# Patient Record
Sex: FEMALE | Race: BLACK OR AFRICAN AMERICAN | NOT HISPANIC OR LATINO | URBAN - METROPOLITAN AREA
[De-identification: names, ages, dates, MRNs, and addresses within clinical notes are randomized per-mention and may not be internally consistent; named-entity substitution may affect disease eponyms.]

---

## 2021-11-01 ENCOUNTER — OFFICE (OUTPATIENT)
Dept: URBAN - METROPOLITAN AREA CLINIC 19 | Facility: CLINIC | Age: 56
End: 2021-11-01
Payer: COMMERCIAL

## 2021-11-01 VITALS
HEART RATE: 98 BPM | WEIGHT: 204 LBS | DIASTOLIC BLOOD PRESSURE: 73 MMHG | HEIGHT: 63 IN | SYSTOLIC BLOOD PRESSURE: 129 MMHG | OXYGEN SATURATION: 99 %

## 2021-11-01 DIAGNOSIS — K58.9 IRRITABLE BOWEL SYNDROME WITHOUT DIARRHEA: ICD-10-CM

## 2021-11-01 DIAGNOSIS — R14.0 ABDOMINAL DISTENSION (GASEOUS): ICD-10-CM

## 2021-11-01 DIAGNOSIS — K59.00 CONSTIPATION, UNSPECIFIED: ICD-10-CM

## 2021-11-01 DIAGNOSIS — K21.9 GASTRO-ESOPHAGEAL REFLUX DISEASE WITHOUT ESOPHAGITIS: ICD-10-CM

## 2021-11-01 LAB
CELIAC AB TTG DGP TIGA: DEAMIDATED GLIADIN ABS, IGA: 13 UNITS (ref 0–19)
CELIAC AB TTG DGP TIGA: DEAMIDATED GLIADIN ABS, IGG: 2 UNITS (ref 0–19)
CELIAC AB TTG DGP TIGA: IMMUNOGLOBULIN A, QN, SERUM: 438 MG/DL — HIGH (ref 87–352)
CELIAC AB TTG DGP TIGA: T-TRANSGLUTAMINASE (TTG) IGA: <2 U/ML
CELIAC AB TTG DGP TIGA: T-TRANSGLUTAMINASE (TTG) IGG: <2 U/ML
TSH: 2.94 UIU/ML (ref 0.45–4.5)

## 2021-11-01 PROCEDURE — 99204 OFFICE O/P NEW MOD 45 MIN: CPT

## 2021-11-01 RX ORDER — LINACLOTIDE 290 UG/1
CAPSULE, GELATIN COATED ORAL
Qty: 90 | Refills: 3 | Status: ACTIVE
Start: 2021-11-01

## 2021-11-01 NOTE — SERVICEHPINOTES
56-year-old single black female here for complaints of chronic constipation .  She has apparently had chronic constipation for years that had previously been managed with MiraLax and daily stool softeners.  It is significantly worsened over the last year so.  Her PCP, Greg Almonte MD gave her samples of Linzess 290 mcg which is actually worked well for constipation.  She no longer has any samples left and would like a prescription for this.  She has tried increasing fiber in her diet and fiber supplements, but denies any significant improvement in her constipation.  She also has chronic reflux, but this seems to be well managed on famotidine 20 milligrams BID.  She denies dysphagia, abdominal pain, or overt GI bleeding.  She does have bloating that she attributes to her constipation.  Routine blood work was apparently unremarkable with her PCP (records requested ).  She does have a history of breast cancer diagnosed in 2018 and she underwent chemotherapy and a bilateral mastectomy.  This was treated by [facility] [place].  At a similar time, she had a colonoscopy at [facility] which was reportedly unremarkable ( records requested ).  She denies any history of colon polyps.  Family history is negative for colon neoplasm.

## 2024-09-02 ENCOUNTER — ON CAMPUS - OUTPATIENT (OUTPATIENT)
Dept: URBAN - METROPOLITAN AREA HOSPITAL 97 | Facility: HOSPITAL | Age: 59
End: 2024-09-02
Payer: COMMERCIAL

## 2024-09-02 PROCEDURE — 99222 1ST HOSP IP/OBS MODERATE 55: CPT | Performed by: INTERNAL MEDICINE

## 2024-09-02 PROCEDURE — 99204 OFFICE O/P NEW MOD 45 MIN: CPT | Performed by: INTERNAL MEDICINE
